# Patient Record
Sex: FEMALE | Race: WHITE
[De-identification: names, ages, dates, MRNs, and addresses within clinical notes are randomized per-mention and may not be internally consistent; named-entity substitution may affect disease eponyms.]

---

## 2018-03-20 ENCOUNTER — HOSPITAL ENCOUNTER (OUTPATIENT)
Dept: HOSPITAL 41 - JD.SDS | Age: 73
Discharge: HOME | End: 2018-03-20
Attending: OPHTHALMOLOGY
Payer: MEDICARE

## 2018-03-20 DIAGNOSIS — Z79.01: ICD-10-CM

## 2018-03-20 DIAGNOSIS — Z79.4: ICD-10-CM

## 2018-03-20 DIAGNOSIS — E78.00: ICD-10-CM

## 2018-03-20 DIAGNOSIS — Z87.891: ICD-10-CM

## 2018-03-20 DIAGNOSIS — Z79.899: ICD-10-CM

## 2018-03-20 DIAGNOSIS — I10: ICD-10-CM

## 2018-03-20 DIAGNOSIS — J45.909: ICD-10-CM

## 2018-03-20 DIAGNOSIS — H26.9: Primary | ICD-10-CM

## 2018-03-20 DIAGNOSIS — E11.36: ICD-10-CM

## 2018-03-20 PROCEDURE — C1780 LENS, INTRAOCULAR (NEW TECH): HCPCS

## 2018-03-20 PROCEDURE — 66984 XCAPSL CTRC RMVL W/O ECP: CPT

## 2018-03-20 RX ADMIN — POLYMYXIN B SULFATE AND TRIMETHOPRIM SULFATE SCH ML: 10000; 1 SOLUTION/ DROPS OPHTHALMIC at 11:19

## 2018-03-20 RX ADMIN — POLYMYXIN B SULFATE AND TRIMETHOPRIM SULFATE SCH DROP: 10000; 1 SOLUTION/ DROPS OPHTHALMIC at 08:17

## 2018-03-20 RX ADMIN — TETRACAINE HYDROCHLORIDE SCH DROP: 5 SOLUTION OPHTHALMIC at 10:42

## 2018-03-20 RX ADMIN — TETRACAINE HYDROCHLORIDE SCH ML: 5 SOLUTION OPHTHALMIC at 11:09

## 2018-03-20 RX ADMIN — POLYMYXIN B SULFATE AND TRIMETHOPRIM SULFATE SCH DROP: 10000; 1 SOLUTION/ DROPS OPHTHALMIC at 09:10

## 2018-03-20 NOTE — PCM.[email or web address]
Preanesthetic Assessment





- Anesthesia/Transfusion/Family Hx


Anesthesia History: No Prior Anesthesia


Family History of Anesthesia Reaction: No


Transfusion History: No Prior Transfusion(s)





- Review of Systems


General: No Symptoms


Pulmonary: Other (ASHTHMA, CONTROLLED WITH INHALER)


Cardiovascular: Other (HTN, STATES HAS A BAD VALVE AND ON COUMADIN FOR THAT )


Gastrointestinal: No Symptoms


Neurological: Other (ATRTHRITIS)


Other: Reports: Easy Bleeding, Easy Bruising, Diabetes (NON INSULIN, ORAL AGENTS

, PATIENT STATES SHE RUNS 175-200)





- Physical Assessment


NPO Status Date: 03/19/18


NPO Status Time: 18:00


Pulse: 84


O2 Sat by Pulse Oximetry: 96


Respiratory Rate: 16


Blood Pressure: 142/60


Vital Signs: 





 Last Vital Signs











Temp  36.2 C   03/20/18 08:15


 


Pulse  84   03/20/18 08:15


 


Resp  16   03/20/18 08:15


 


BP  142/60 H  03/20/18 08:15


 


Pulse Ox  96   03/20/18 08:15











Height: 1.65 m


Weight: 101.151 kg


ASA Class: 3


Mental Status: Alert & Oriented x3


Airway Class: Mallampati = 2


Dentition: Reports: Normal Dentition (LOWER), Dentures (UPPER)


Thyro-Mental Finger Breadths: 3


Mouth Opening Finger Breadths: 3


ROM/Head Extension: Full


Lungs: Clear to Auscultation, Normal Respiratory Effort


Cardiovascular: Regular Rate, Regular Rhythm, Murmurs





- Allergies


Allergies/Adverse Reactions: 


 Allergies











Allergy/AdvReac Type Severity Reaction Status Date / Time


 


No Known Allergies Allergy   Verified 03/15/18 12:02














- Blood


Blood Available: No


Product(s) Available: None





- Anesthesia Plan


Pre-Op Medication Ordered: None





- Acknowledgements


Anesthesia Type Planned: MAC


Pt an Appropriate Candidate for the Planned Anesthesia: Yes


Alternatives and Risks of Anesthesia Discussed w Pt/Guardian: Yes


Pt/Guardian Understands and Agrees with Anesthesia Plan: Yes





PreAnesthesia Questionnaire





- CURRENT (IN HOUSE) MEDS


Current Meds: 





 Current Medications





Brimonidine Tartrate (Alphagan 0.2% Ophth Soln)  0 ml EYELF ASDIRECTED DANNIELLE


   Stop: 03/20/18 18:00


   Last Admin: 03/20/18 08:21 Dose:  1 drop


Cefuroxime Sodium (Zinacef)  0 mg EYELF ASDIRECTED DANNIELLE


   Stop: 03/20/18 18:00


Lidocaine HCl (Xylocaine-Mpf 1%)  10 ml INJECT ASDIRECTED DANNIELLE


   Stop: 03/20/18 18:00


Phenylephrine HCl (Raymon-Synephrine 2.5% Ophth Soln)  0 ml EYELF ASDIRECTED DANNIELLE


   Stop: 03/20/18 18:00


   Last Admin: 03/20/18 08:26 Dose:  1 drop


Pilocarpine HCl (Pilocar 4% Ophth Soln)  0 ml EYELF ASDIRECTED DANNIELLE


   Stop: 03/20/18 18:00


Polymyxin/Trimethoprim Sulfate (Polytrim Ophth Soln)  0 ml EYELF ASDIRECTED DANNIELLE


   Stop: 03/20/18 18:00


   Last Admin: 03/20/18 08:17 Dose:  1 drop


Tetracaine HCl (Tetracaine 0.5% Steri-Unit Sol)  0 ml EYELF ASDIRECTED DANNIELLE


   Stop: 03/20/18 18:00


Tropicamide (Mydriacyl 1% Ophth Soln)  0 ml EYELF ASDIRECTED DANNIELLE


   Stop: 03/20/18 18:00


   Last Admin: 03/20/18 08:30 Dose:  1 drop

## 2018-03-20 NOTE — [id number]
Post Anesthesia Note





- EVALUATION WITHIN 48HRS OF ANESTHETIC


Vital Signs in Normal Range: Yes


Patient Participated in Evaluation: Yes


Respiratory Function Stable: Yes


Airway Patent: Yes


Cardiovascular Function Stable: Yes


Hydration Status Stable: Yes


Pain Control Satisfactory: Yes


Nausea and Vomiting Control Satisfactory: Yes


Mental Status Recovered: Yes


Pulse Rate: 72


SaO2: 99


Resp Rate: 16


Temperature: 36 C


Blood Pressure: 155/62